# Patient Record
Sex: FEMALE | Race: WHITE
[De-identification: names, ages, dates, MRNs, and addresses within clinical notes are randomized per-mention and may not be internally consistent; named-entity substitution may affect disease eponyms.]

---

## 2021-09-29 ENCOUNTER — HOSPITAL ENCOUNTER (INPATIENT)
Dept: HOSPITAL 56 - MW.OB | Age: 36
LOS: 3 days | Discharge: HOME | End: 2021-10-02
Attending: OBSTETRICS & GYNECOLOGY | Admitting: OBSTETRICS & GYNECOLOGY
Payer: COMMERCIAL

## 2021-09-29 DIAGNOSIS — Z3A.39: ICD-10-CM

## 2021-09-29 DIAGNOSIS — Z20.822: ICD-10-CM

## 2021-09-29 PROCEDURE — U0002 COVID-19 LAB TEST NON-CDC: HCPCS

## 2021-09-30 LAB
BUN SERPL-MCNC: 8 MG/DL (ref 7–18)
CHLORIDE SERPL-SCNC: 106 MMOL/L (ref 98–107)
CO2 SERPL-SCNC: 21.1 MMOL/L (ref 21–32)
GLUCOSE SERPL-MCNC: 91 MG/DL (ref 74–106)
POTASSIUM SERPL-SCNC: 4.1 MMOL/L (ref 3.5–5.1)
SODIUM SERPL-SCNC: 140 MMOL/L (ref 136–145)

## 2021-09-30 PROCEDURE — 0HQ9XZZ REPAIR PERINEUM SKIN, EXTERNAL APPROACH: ICD-10-PCS | Performed by: OBSTETRICS & GYNECOLOGY

## 2021-09-30 RX ADMIN — WITCH HAZEL PRN CONTAINER: 500 SOLUTION RECTAL; TOPICAL at 03:03

## 2021-09-30 NOTE — OR
Care Management Discharge Note    Discharge Date: 02/09/21       Discharge Disposition: Home Care    Discharge Services:      Discharge DME:      Discharge Transportation:      Private pay costs discussed: Not applicable    PAS Confirmation Code:    Patient/family educated on Medicare website which has current facility and service quality ratings: no    Education Provided on the Discharge Plan:    Persons Notified of Discharge Plans: Pts RN Jeni, and her son Michele  Patient/Family in Agreement with the Plan:  yes    Handoff Referral Completed: No    Additional Information:  Patient to discharge to [facility] today with [facility] care resumption; PT OT and RN.   Writer faxed discharge orders, med list and H&P  to [facility] f) [phone].  Writer phone patients son Michele and let him know there may be a charge for the ride; $75.00 base, then about $5.00 per mile.  Michele verbalized understanding of info given.    [facility] ride arranged for 5:30 PM; bedside nurse is aware.    PASS completed and faxed at 1435, placed on paper chart. KK    1517: Writer spoke with Johnathon at [facility], confirmed pt will return today and ride is at 5:30 PM.  Johnathon verbalized understanding and requested writer fax discontinue orders, and med list to [phone].  SHEREE Tafoya RN       SURGEON:

Fabiola Medrano M.D.

 

DATE OF PROCEDURE:  2021

 

PREOPERATIVE DIAGNOSES:

1. 39-3/7 week intrauterine pregnancy.

2. Active spontaneous labor.

 

POSTOPERATIVE DIAGNOSES:

1. 39-3/7 week intrauterine pregnancy.

2. Active spontaneous labor.

3. Precipitous delivery.

 

PRIMARY SURGEON:

Fabiola Medrano M.D.

 

ANESTHESIA:

None.

 

EBL:

Approximately 300 mL.

 

FINDINGS:

Live born male, Apgar scores 8 and 9, and weight is pending.

 

COMPLICATIONS:

None known.

 

DISPOSITION:

Stable.  Mother and baby are stable in LDR.

 

BRIEF HISTORY:

This is a 36-year-old female.  She is G3, P 2-0-0-2.  She presents to Labor and

Delivery with complaint of regular contractions.  She was admitted to Labor and

Delivery.  Initial blood pressure was elevated.  She denies headache, visual

changes, or other symptoms.  She has been monitored for preeclampsia in the

clinic, and she states that her platelets have been borderline through the

pregnancy.  She had labs drawn at approximately 11:45 p.m.  She was placed in

the tub, and awaiting laboratory studies.  Fetal heart tones were category I

upon admission.

 

DESCRIPTION OF PROCEDURE:

At approximately 0042, she had spontaneous rupture of membranes and subsequently

had a precipitous delivery in the tub.  I was called at 0047 and arrived 10

minutes later.  The placenta had been delivered spontaneously prior to my

arrival.  The cord had also been clamped and cut prior to my arrival.  Upon my

arrival, the patient was still quite uncomfortable.  There was minimal lochia.

Examination revealed a lower vaginal laceration at 6 o'clock extending to the

perineum, but not beyond.  There was also a periurethral abrasion.  The patient

was given Stadol IV.  Lidocaine was injected in the vaginal and perineal area.

The laceration was repaired with a running lock suture of 3-0 Vicryl.  Sponge,

needle, and instrument counts were correct.  There were no known complications.

Mother and baby remained in LDR in good condition.

 

 

IAN / BERTA

DD:  2021 02:00:16

DT:  2021 02:19:51

Job #:  [id number]

## 2021-09-30 NOTE — PCM.OPNOTE
- General Post-Op/Procedure Note


Date of Surgery/Procedure: 21


Operative Procedure(s): precipitous vaginal delivery, repair of vaginal 

laceration


Findings: 





Liveborn male APGAR 8/9, small vaginal laceration, periurethral abrasion


Pre Op Diagnosis: 39 3/7 weeks, labor


Post-Op Diagnosis: Same and precipitous delivery


Anesthesia Technique: Local


Primary Surgeon: Fabiola Medrano


Pathology: 





none





EBL in mLs: 300


Complications: None Known


Condition: Good

## 2021-10-01 RX ADMIN — WITCH HAZEL PRN TUB: 500 SOLUTION RECTAL; TOPICAL at 08:37

## 2021-10-01 NOTE — PCM.PNPP
- General Info


Date of Service: 10/01/21


Admission Dx/Problem (Free Text): 





Pregnancy


Subjective Update: 





Resting comfortably in bed with infant. Pain well controlled. Ambulating and 

voiding without difficulty. Tolerating regular diet. Breastfeeding going well. 

Continues to have carpal tunnel-like symptoms in bilateral wrists.





- General Info


Date of Service: 10/01/21





- Patient Data


Vital Signs - Most Recent: 


                                Last Vital Signs











Temp  97.1 F   10/01/21 03:57


 


Pulse  79   10/01/21 03:57


 


Resp  18   10/01/21 03:57


 


BP  137/79   10/01/21 03:57


 


Pulse Ox  98   10/01/21 03:57











Weight - Most Recent: 209 lb


Lab Results - Last 24 Hours: 


                         Laboratory Results - last 24 hr











  10/01/21 Range/Units





  06:28 


 


Hgb  12.1  (12.0-16.0)  g/dL


 


Hct  36.3  (36.0-46.0)  %











Med Orders - Current: 


                               Current Medications





Acetaminophen (Acetaminophen 500 Mg Tab)  500 mg PO Q4H PRN


   PRN Reason: Pain (mild 1-3)


Acetaminophen (Acetaminophen 500 Mg Tab)  1,000 mg PO Q4H PRN


   PRN Reason: Pain (mild 1-3)


   Last Admin: 21 14:21 Dose:  1,000 mg


   Documented by: 


Benzocaine/Menthol (Benzocaine/Menthol 20%-0.5% Spray 78 Gm Cannister)  78 gm 

TOP ASDIRECTED PRN


   PRN Reason: Perineal Comfort Measure


   Last Admin: 21 03:03 Dose:  1 canister


   Documented by: 


Bisacodyl (Bisacodyl 10 Mg Supp)  10 mg RECTAL ONETIME PRN


   PRN Reason: Constipation


Docusate Sodium (Docusate Sodium 100 Mg Cap)  100 mg PO Q12H PRN


   PRN Reason: Constipation


   Last Admin: 21 08:14 Dose:  100 mg


   Documented by: 


Emollient Ointment (Lanolin 100% Cream 7 Gm Tube)  0 gm TOP ASDIRECTED PRN


   PRN Reason: Sore Nipples


Tranexamic Acid 1,000 mg/ (Sodium Chloride)  110 mls @ 660 mls/hr IV ONETIME PRN


   PRN Reason: Bleeding


Ibuprofen (Ibuprofen 400 Mg Tab)  400 mg PO Q4H PRN


   PRN Reason: Pain (mild 1-3)


Ibuprofen (Ibuprofen 800 Mg Tab)  800 mg PO Q6H PRN


   PRN Reason: Cramping


   Last Admin: 21 20:14 Dose:  800 mg


   Documented by: 


Witch Hazel (Witch Hazel Medicated Pads 40/Jar)  1 pad TOP ASDIRECTED PRN


   PRN Reason: comfort care


   Last Admin: 21 03:03 Dose:  1 container


   Documented by: 





Discontinued Medications





Ampicillin Sodium (Ampicillin 2 Gm Vial) Confirm Administered Dose 2 gm .ROUTE 

.STK-MED ONE


   Stop: 21 23:51


   Last Admin: 21 13:40 Dose:  Not Given


   Documented by: 


Butorphanol Tartrate (Butorphanol 1 Mg/Ml Sdv) Confirm Administered Dose 1 mg 

.ROUTE .STK-MED ONE


   Stop: 21 01:04


   Last Admin: 21 01:10 Dose:  1 mg


   Documented by: 


Butorphanol Tartrate (Butorphanol 1 Mg/Ml Sdv)  1 mg IVPUSH ONETIME ONE


   Stop: 21 01:01


   Last Admin: 21 16:00 Dose:  Not Given


   Documented by: 


Carboprost Tromethamine (Carboprost Tromethamine 250 Mcg/1 Ml Amp)  250 mcg IM 

ASDIRECTED PRN


   PRN Reason: Post Partum Hemorrhage


Oxytocin/Sodium Chloride (Oxytocin 30 Unit In Ns 0.9% 500 Ml Premix)  30 unit in

500 mls @ 500 mls/hr IV TITRATE [facility]


   Last Admin: 21 00:50 Dose:  500 mls/hr


   Documented by: 


Tranexamic Acid 1,000 mg/ (Sodium Chloride)  110 mls @ 660 mls/hr IV ONETIME PRN


   PRN Reason: Bleeding


Ampicillin Sodium 2 gm/ Sodium (Chloride)  100 mls @ 200 mls/hr IV ONETIME ONE


   Stop: 21 00:29


   Last Admin: 21 00:00 Dose:  200 mls/hr


   Documented by: 


Ampicillin Sodium 1 gm/ Sodium (Chloride)  50 mls @ 100 mls/hr IV Q4H [facility]


Lactated Ringer's (Ringers, Lactated)  1,000 mls @ 150 mls/hr IV ASDIRECTED [facility]


   Last Admin: 21 23:50 Dose:  150 mls/hr


   Documented by: 


Sodium Chloride (Normal Saline) Confirm Administered Dose 100 mls @ as directed 

.ROUTE .STK-MED ONE


   Stop: 21 23:51


   Last Admin: 21 13:41 Dose:  Not Given


   Documented by: 


Lidocaine HCl (Lidocaine 1% 50 Ml Mdv)  50 ml INJECT ONETIME PRN


   PRN Reason: Laceration repair


   Last Admin: 21 01:15 Dose:  50 ml


   Documented by: 


Methylergonovine Maleate (Methylergonovine 0.2 Mg/1 Ml Amp)  0.2 mg IM 

ASDIRECTED PRN


   PRN Reason: Post Partum Hemorrhage


Misoprostol (Misoprostol 200 Mcg Tab)  200 mcg PO ONETIME PRN


   PRN Reason: Post Partum Hemorrhage


Ondansetron HCl (Ondansetron 4 Mg/2 Ml Sdv)  4 mg IVPUSH Q6H PRN


   PRN Reason: Nausea/Vomiting


Sodium Chloride (Sodium Chloride 0.9% 10 Ml Syringe)  10 ml FLUSH ASDIRECTED PRN


   PRN Reason: Keep Vein Open


Sodium Chloride (Sodium Chloride 0.9% 2.5 Ml Syringe)  2.5 ml FLUSH ASDIRECTED 

PRN


   PRN Reason: Keep Vein Open


Sodium Chloride (Sodium Chloride 0.9% 10 Ml Sdv)  10 ml IV ASDIRECTED PRN


   PRN Reason: IV Use


Sterile Water (Water For Irrigation,Sterile 1,000 Ml Container)  1,000 ml IRR 

ASDIRECTED PRN


   PRN Reason: delivery











- Infant Interaction


Infant Disposition, Postpartum: Girard in Room with Family


Infant Interaction: Holding Infant


Infant Feeding:  Infant; Nursed Well


Support Person: 





- Postpartum Recovery Exam


Fundal Tone: Firm


Fundal Level: 2 Fingerbreadths Below Umbilicus


Fundal Placement: Midline


Lochia Amount: Scant


Lochia Color: Rubra/Red


Perineum Description: Other (see below)


Other Perinuem Description: 1 degree laceration


Episiotomy/Laceration: Approximated


Bladder Status: Voiding





- Exam


General: Alert


Lungs: Normal Respiratory Effort


Cardiovascular: Regular Rate


GI/Abdominal Exam: Soft, Non-Tender


Extremities: Normal Range of Motion, Non-Tender, Pedal Edema (trace)


Skin: Warm, Dry, Intact


Wound/Incisions: Healing Well


Neurological: No New Focal Deficit


Psy/Mental Status: Normal Mood





- Problem List Review


Problem List Initiated/Reviewed/Updated: Yes





- Assessment


Assessment:: 





36 year old G3 now P3 PPD1 s/p precipitous delivery








- Plan


Plan:: 





Routine postpartum cares


* Rh negative, may be candidate for postpartum Rhogam pending infant studies


* Rubella immune


* GBS positive, received one dose of Ampicillin prior to delivery


* PO pain medications PRN


* Regular diet as tolerated


* Encourage ambulation and fluid intake


* Desires circumcision, will discuss with pediatrician today


* Breastfeeding, nursing assistance as needed





Dispo: stable. Anticipate discharge tomorrow due to GBS+ status and inadequate 

antibiotics prior to delivery.

## 2021-10-02 VITALS — SYSTOLIC BLOOD PRESSURE: 128 MMHG | DIASTOLIC BLOOD PRESSURE: 75 MMHG | HEART RATE: 80 BPM

## 2021-10-02 NOTE — PCM.PNPP
- General Info


Date of Service: 10/02/21


Subjective Update: 





Patient has no concerns this morning. Tolerating oral intake, ambulating, 

voiding without difficulty. Minimal lochia and pain.


Functional Status: Reports: Pain Controlled, Tolerating Diet, Ambulating, 

Urinating





- Review of Systems


General: Reports: No Symptoms


HEENT: Reports: No Symptoms


Pulmonary: Reports: No Symptoms


Cardiovascular: Reports: No Symptoms


Gastrointestinal: Reports: No Symptoms


Genitourinary: Reports: No Symptoms


Musculoskeletal: Reports: No Symptoms


Skin: Reports: No Symptoms


Neurological: Reports: No Symptoms


Psychiatric: Reports: No Symptoms





- Patient Data


Vital Signs - Most Recent: 


                                Last Vital Signs











Temp  36.6 C   10/02/21 08:00


 


Pulse  80   10/02/21 08:00


 


Resp  18   10/02/21 08:00


 


BP  128/75   10/02/21 08:00


 


Pulse Ox  96   10/02/21 08:00











Weight - Most Recent: 94.801 kg


Lab Results - Last 24 Hours: 


                         Laboratory Results - last 24 hr











  21 Range/Units





  23:40 


 


RPR  Non-Reac  (Non-Reac)  











Med Orders - Current: 


                               Current Medications





Acetaminophen (Acetaminophen 500 Mg Tab)  500 mg PO Q4H PRN


   PRN Reason: Pain (mild 1-3)


Acetaminophen (Acetaminophen 500 Mg Tab)  1,000 mg PO Q4H PRN


   PRN Reason: Pain (mild 1-3)


   Last Admin: 21 14:21 Dose:  1,000 mg


   Documented by: 


Benzocaine/Menthol (Benzocaine/Menthol 20%-0.5% Spray 78 Gm Cannister)  78 gm 

TOP ASDIRECTED PRN


   PRN Reason: Perineal Comfort Measure


   Last Admin: 21 03:03 Dose:  1 canister


   Documented by: 


Bisacodyl (Bisacodyl 10 Mg Supp)  10 mg RECTAL ONETIME PRN


   PRN Reason: Constipation


Docusate Sodium (Docusate Sodium 100 Mg Cap)  100 mg PO Q12H PRN


   PRN Reason: Constipation


   Last Admin: 10/01/21 11:41 Dose:  100 mg


   Documented by: 


Emollient Ointment (Lanolin 100% Cream 7 Gm Tube)  0 gm TOP ASDIRECTED PRN


   PRN Reason: Sore Nipples


Tranexamic Acid 1,000 mg/ (Sodium Chloride)  110 mls @ 660 mls/hr IV ONETIME PRN


   PRN Reason: Bleeding


Ibuprofen (Ibuprofen 400 Mg Tab)  400 mg PO Q4H PRN


   PRN Reason: Pain (mild 1-3)


Ibuprofen (Ibuprofen 800 Mg Tab)  800 mg PO Q6H PRN


   PRN Reason: Cramping


   Last Admin: 21 20:14 Dose:  800 mg


   Documented by: 


Witch Hazel (Witch Hazel Medicated Pads 40/Jar)  1 pad TOP ASDIRECTED PRN


   PRN Reason: comfort care


   Last Admin: 10/01/21 08:37 Dose:  1 tub


   Documented by: 





Discontinued Medications





Ampicillin Sodium (Ampicillin 2 Gm Vial) Confirm Administered Dose 2 gm .ROUTE 

.STK-MED ONE


   Stop: 21 23:51


   Last Admin: 21 13:40 Dose:  Not Given


   Documented by: 


Butorphanol Tartrate (Butorphanol 1 Mg/Ml Sdv) Confirm Administered Dose 1 mg 

.ROUTE .STK-MED ONE


   Stop: 21 01:04


   Last Admin: 21 01:10 Dose:  1 mg


   Documented by: 


Butorphanol Tartrate (Butorphanol 1 Mg/Ml Sdv)  1 mg IVPUSH ONETIME ONE


   Stop: 21 01:01


   Last Admin: 21 16:00 Dose:  Not Given


   Documented by: 


Carboprost Tromethamine (Carboprost Tromethamine 250 Mcg/1 Ml Amp)  250 mcg IM A

SDIRECTED PRN


   PRN Reason: Post Partum Hemorrhage


Oxytocin/Sodium Chloride (Oxytocin 30 Unit In Ns 0.9% 500 Ml Premix)  30 unit in

500 mls @ 500 mls/hr IV TITRATE [facility]


   Last Admin: 21 00:50 Dose:  500 mls/hr


   Documented by: 


Tranexamic Acid 1,000 mg/ (Sodium Chloride)  110 mls @ 660 mls/hr IV ONETIME PRN


   PRN Reason: Bleeding


Ampicillin Sodium 2 gm/ Sodium (Chloride)  100 mls @ 200 mls/hr IV ONETIME ONE


   Stop: 21 00:29


   Last Admin: 21 00:00 Dose:  200 mls/hr


   Documented by: 


Ampicillin Sodium 1 gm/ Sodium (Chloride)  50 mls @ 100 mls/hr IV Q4H [facility]


Lactated Ringer's (Ringers, Lactated)  1,000 mls @ 150 mls/hr IV ASDIRECTED [facility]


   Last Admin: 21 23:50 Dose:  150 mls/hr


   Documented by: 


Sodium Chloride (Normal Saline) Confirm Administered Dose 100 mls @ as directed 

.ROUTE .STK-MED ONE


   Stop: 21 23:51


   Last Admin: 21 13:41 Dose:  Not Given


   Documented by: 


Lidocaine HCl (Lidocaine 1% 50 Ml Mdv)  50 ml INJECT ONETIME PRN


   PRN Reason: Laceration repair


   Last Admin: 21 01:15 Dose:  50 ml


   Documented by: 


Methylergonovine Maleate (Methylergonovine 0.2 Mg/1 Ml Amp)  0.2 mg IM 

ASDIRECTED PRN


   PRN Reason: Post Partum Hemorrhage


Misoprostol (Misoprostol 200 Mcg Tab)  200 mcg PO ONETIME PRN


   PRN Reason: Post Partum Hemorrhage


Ondansetron HCl (Ondansetron 4 Mg/2 Ml Sdv)  4 mg IVPUSH Q6H PRN


   PRN Reason: Nausea/Vomiting


Sodium Chloride (Sodium Chloride 0.9% 10 Ml Syringe)  10 ml FLUSH ASDIRECTED PRN


   PRN Reason: Keep Vein Open


Sodium Chloride (Sodium Chloride 0.9% 2.5 Ml Syringe)  2.5 ml FLUSH ASDIRECTED 

PRN


   PRN Reason: Keep Vein Open


Sodium Chloride (Sodium Chloride 0.9% 10 Ml Sdv)  10 ml IV ASDIRECTED PRN


   PRN Reason: IV Use


Sterile Water (Water For Irrigation,Sterile 1,000 Ml Container)  1,000 ml IRR 

ASDIRECTED PRN


   PRN Reason: delivery











- Infant Interaction


Infant Disposition, Postpartum:  in Room with Family


Infant Interaction: Holding Infant


Infant Feeding:  Infant; Nursed Well


Support Person: 





- Postpartum Recovery Exam


Fundal Tone: Firm


Fundal Level: 2 Fingerbreadths Below Umbilicus


Fundal Placement: Midline


Lochia Amount: Scant


Lochia Color: Rubra/Red


Other Perinuem Description: 1 degree laceration


Bladder Status: Voiding


Urinary Elimination: Voided





- Exam


General: Alert, Oriented


Neck: Supple


Lungs: Normal Respiratory Effort


GI/Abdominal Exam: Soft, Non-Tender, Other (fundus firm below umbilicus)


Extremities: Non-Tender, No Pedal Edema


Skin: Warm, Dry, Intact


Neurological: No New Focal Deficit


Psy/Mental Status: Alert, Normal Affect, Normal Mood





- Problem List & Annotations


(1) Vaginal delivery


SNOMED Code(s): 935722359


   Code(s): O80 - ENCOUNTER FOR FULL-TERM UNCOMPLICATED DELIVERY   Status: Acute

  Current Visit: No   





- Problem List Review


Problem List Initiated/Reviewed/Updated: Yes





- My Orders


Last 24 Hours: 


My Active Orders





10/02/21 10:00


Ready for Discharge [RC] PER UNIT ROUTINE 














- Assessment


Assessment:: 





36 year old G3 now P3 PPD2 s/p precipitous delivery








- Plan


Plan:: 


Plan for discharge home today. Reviewed discharge instructions/precautions. 

Patient and infant Rh negative, Rhogam not indicated. All questions answered.